# Patient Record
Sex: MALE | Race: WHITE
[De-identification: names, ages, dates, MRNs, and addresses within clinical notes are randomized per-mention and may not be internally consistent; named-entity substitution may affect disease eponyms.]

---

## 2019-09-12 ENCOUNTER — HOSPITAL ENCOUNTER (OUTPATIENT)
Dept: HOSPITAL 62 - SC | Age: 69
Discharge: HOME | End: 2019-09-12
Attending: OPHTHALMOLOGY
Payer: OTHER GOVERNMENT

## 2019-09-12 DIAGNOSIS — H25.12: Primary | ICD-10-CM

## 2019-09-12 DIAGNOSIS — Z79.899: ICD-10-CM

## 2019-09-12 DIAGNOSIS — I10: ICD-10-CM

## 2019-09-12 DIAGNOSIS — Z87.891: ICD-10-CM

## 2019-09-12 PROCEDURE — 66984 XCAPSL CTRC RMVL W/O ECP: CPT

## 2019-09-12 PROCEDURE — V2632 POST CHMBR INTRAOCULAR LENS: HCPCS

## 2019-09-12 PROCEDURE — 00142 ANES PX ON EYE LENS SURGERY: CPT

## 2019-09-12 RX ADMIN — TETRACAINE HYDROCHLORIDE PRN DROP: 5 SOLUTION OPHTHALMIC at 07:41

## 2019-09-12 RX ADMIN — TROPICAMIDE PRN DROP: 10 SOLUTION/ DROPS OPHTHALMIC at 07:29

## 2019-09-12 RX ADMIN — SODIUM CHONDROITIN SULFATE / SODIUM HYALURONATE ONE EACH: 0.55-0.5 INJECTION INTRAOCULAR at 00:00

## 2019-09-12 RX ADMIN — EPINEPHRINE ONE MG: 1 INJECTION, SOLUTION, CONCENTRATE INTRAVENOUS at 07:58

## 2019-09-12 RX ADMIN — BESIFLOXACIN PRN DROP: 6 SUSPENSION OPHTHALMIC at 07:41

## 2019-09-12 RX ADMIN — Medication ONE ML: at 00:00

## 2019-09-12 RX ADMIN — EPINEPHRINE ONE MG: 1 INJECTION, SOLUTION, CONCENTRATE INTRAVENOUS at 00:00

## 2019-09-12 RX ADMIN — SODIUM CHONDROITIN SULFATE / SODIUM HYALURONATE ONE EACH: 0.55-0.5 INJECTION INTRAOCULAR at 07:58

## 2019-09-12 RX ADMIN — TETRACAINE HYDROCHLORIDE PRN DROP: 5 SOLUTION OPHTHALMIC at 07:16

## 2019-09-12 RX ADMIN — CYCLOPENTOLATE HYDROCHLORIDE AND PHENYLEPHRINE HYDROCHLORIDE PRN DROP: 2; 10 SOLUTION/ DROPS OPHTHALMIC at 07:29

## 2019-09-12 RX ADMIN — Medication ONE ML: at 07:58

## 2019-09-12 RX ADMIN — CYCLOPENTOLATE HYDROCHLORIDE AND PHENYLEPHRINE HYDROCHLORIDE PRN DROP: 2; 10 SOLUTION/ DROPS OPHTHALMIC at 07:17

## 2019-09-12 RX ADMIN — BESIFLOXACIN PRN DROP: 6 SUSPENSION OPHTHALMIC at 08:08

## 2019-09-12 RX ADMIN — TROPICAMIDE PRN DROP: 10 SOLUTION/ DROPS OPHTHALMIC at 07:16

## 2019-09-12 RX ADMIN — BESIFLOXACIN PRN DROP: 6 SUSPENSION OPHTHALMIC at 07:17

## 2019-09-12 RX ADMIN — DORZOLAMIDE HYDROCHLORIDE AND TIMOLOL MALEATE PRN DROP: 20; 5 SOLUTION OPHTHALMIC at 00:00

## 2019-09-12 RX ADMIN — TETRACAINE HYDROCHLORIDE PRN DROP: 5 SOLUTION OPHTHALMIC at 00:00

## 2019-09-12 RX ADMIN — CYCLOPENTOLATE HYDROCHLORIDE AND PHENYLEPHRINE HYDROCHLORIDE PRN DROP: 2; 10 SOLUTION/ DROPS OPHTHALMIC at 07:40

## 2019-09-12 RX ADMIN — TROPICAMIDE PRN DROP: 10 SOLUTION/ DROPS OPHTHALMIC at 07:40

## 2019-09-12 RX ADMIN — TETRACAINE HYDROCHLORIDE PRN DROP: 5 SOLUTION OPHTHALMIC at 07:50

## 2019-09-12 RX ADMIN — DORZOLAMIDE HYDROCHLORIDE AND TIMOLOL MALEATE PRN DROP: 20; 5 SOLUTION OPHTHALMIC at 08:08

## 2019-09-12 RX ADMIN — BESIFLOXACIN PRN DROP: 6 SUSPENSION OPHTHALMIC at 00:00

## 2019-09-13 NOTE — OPERATIVE REPORT
Operative Report-[facility]


Operative Report: 





DATE OF SURGERY: 9/12/2019


PREOPERATIVE DIAGNOSIS: Cataracts, left eye


POSTOPERATIVE DIAGNOSIS: Cataract, left eye OPERATION: Cataract extraction with 

insertion of an IOL of the left eye.


Intraocular Lens Model: [19.0 sn60wf] Reason for surgery was difficulty seeing 

small print and trouble seeing the television


SURGEON: José Antonio Gomez MD


ANESTHESIA: Topical


PROCEDURE: After obtaining appropriate consent, the patient's left eye was 

prepped and draped in a sterile fashion as well as the surgeon in the sterile 

manner and cataract surgery was started. First a paracentesis blade was used to 

make a side-port incision. Viscoelastic was used to inflate the anterior 

chamber. Next a 2.4 mm incision was made with a 2.4 mm blade, clear corneal 

temporarily. A continuous capsulorrhexis was made using a cystotome and Utrata 

forceps. Following this hydrodissection was carried out to make commands fully 

loose and mobile and it was rotated 90 degrees. Following this, a divide and 

conquer technique was used to phacoemulsify the lens. The remaining cortex was 

removed with an irrigation/aspiration. Provisc was instilled into the capsular 

bag to inflate the bag.The intracular lens was placed. The remaining 

viscoelastic material was removed with irrigation/aspiration. Following this, 

the incision was found to be watertight. Besivance and Cosopt was instilled into

the eye and a protective shield was placed over the eye. The patient was turned 

to the postoperative recovery in a stable condition.